# Patient Record
Sex: FEMALE | Race: WHITE | Employment: FULL TIME | URBAN - METROPOLITAN AREA
[De-identification: names, ages, dates, MRNs, and addresses within clinical notes are randomized per-mention and may not be internally consistent; named-entity substitution may affect disease eponyms.]

---

## 2024-01-02 ENCOUNTER — OFFICE VISIT (OUTPATIENT)
Age: 44
End: 2024-01-02

## 2024-01-02 VITALS
OXYGEN SATURATION: 98 % | RESPIRATION RATE: 18 BRPM | HEIGHT: 64 IN | HEART RATE: 111 BPM | SYSTOLIC BLOOD PRESSURE: 127 MMHG | WEIGHT: 147.2 LBS | BODY MASS INDEX: 25.13 KG/M2 | DIASTOLIC BLOOD PRESSURE: 88 MMHG | TEMPERATURE: 98.4 F

## 2024-01-02 DIAGNOSIS — J06.9 VIRAL UPPER RESPIRATORY TRACT INFECTION: ICD-10-CM

## 2024-01-02 DIAGNOSIS — J40 BRONCHITIS: Primary | ICD-10-CM

## 2024-01-02 RX ORDER — PREDNISONE 10 MG/1
TABLET ORAL
Qty: 1 EACH | Refills: 0 | Status: SHIPPED | OUTPATIENT
Start: 2024-01-02

## 2024-01-02 RX ORDER — DEXTROMETHORPHAN HYDROBROMIDE AND PROMETHAZINE HYDROCHLORIDE 15; 6.25 MG/5ML; MG/5ML
5 SYRUP ORAL 4 TIMES DAILY PRN
Qty: 100 ML | Refills: 0 | Status: SHIPPED | OUTPATIENT
Start: 2024-01-02 | End: 2024-01-07

## 2024-01-02 RX ORDER — AZITHROMYCIN 250 MG/1
250 TABLET, FILM COATED ORAL SEE ADMIN INSTRUCTIONS
Qty: 6 TABLET | Refills: 0 | Status: SHIPPED | OUTPATIENT
Start: 2024-01-02 | End: 2024-01-07

## 2024-01-02 RX ORDER — BENZONATATE 200 MG/1
200 CAPSULE ORAL 2 TIMES DAILY PRN
Qty: 14 CAPSULE | Refills: 0 | Status: SHIPPED | OUTPATIENT
Start: 2024-01-02 | End: 2024-01-09

## 2024-01-03 NOTE — PATIENT INSTRUCTIONS
If symptoms worsens or fail to improve follow-up with PCP.     Drink plenty of fluids.  Take medications as prescribed

## 2024-01-03 NOTE — PROGRESS NOTES
Subjective:       History was provided by the patient.  Jailene Munroe is a 43 y.o. female who presents for evaluation of symptoms of a URI. Symptoms include chest pain during cough, dry cough, and headache. Onset of symptoms was 1 week ago, gradually worsening since that time. Associated symptoms include congestion and non productive cough.  She is drinking plenty of fluids. Evaluation to date: none. Treatment to date: cold and flu  Patient's medications, allergies, past medical, surgical, social and family histories were reviewed and updated as appropriate.    Review of Systems  Pertinent items are noted in HPI.      Objective:      General appearance: alert, appears stated age, and cooperative  Ears: normal TM's and external ear canals both ears  Nose: no discharge, turbinates normal, no sinus tenderness  Throat: normal findings: pink and moist  Lungs: clear to auscultation bilaterally  Heart: S1, S2 normal and tachycardic   Lymph nodes: Cervical adenopathy: nodes normal         Assessment:      viral upper respiratory illness and bronchitis, persistent cough present. BP normal, tachycardic, afebrile, SPO2 98% on room air.        Plan:      Discussed dx and tx of URIs  Suggested symptomatic OTC remedies.  Nasal saline sprays for congestion.  RTC prn.  -azithromycin (ZITHROMAX) 250 MG tablet,  Take 1 tablet by mouth See Admin Instructions for 5 days 500mg on day 1 followed by 250mg on days 2 - 5, Disp-6 tablet, R-0 Normal   -benzonate (TESSALON) 200 mg capsule,  Take 1 capsule by mouth 2 times daily as needed for Cough, Disp-14 capsule, R-0 Normal   -predniSONE 10 MG (21) TBPK, Take 6 tabs by mouth on day 1. then 5 tabs on day 2. then 4 tabs on day 3. then 3 tabs on day 4. Then 2 tabs on day 5. Then 1 tab on day 6. Take with food., Disp-1 each, R-0 Normal   -promethazine-dextromethorphan (PROMETHAZINE-DM) 6.25-15 MG/5ML syrup, Take 5 mLs by mouth 4 times daily as needed for Cough, Disp-100 mL, R-0 Normal      1.